# Patient Record
Sex: MALE | Race: WHITE | NOT HISPANIC OR LATINO | Employment: UNEMPLOYED | URBAN - METROPOLITAN AREA
[De-identification: names, ages, dates, MRNs, and addresses within clinical notes are randomized per-mention and may not be internally consistent; named-entity substitution may affect disease eponyms.]

---

## 2018-04-20 ENCOUNTER — THERAPY VISIT (OUTPATIENT)
Dept: PHYSICAL THERAPY | Facility: CLINIC | Age: 38
End: 2018-04-20
Payer: COMMERCIAL

## 2018-04-20 DIAGNOSIS — M54.50 LUMBAGO: Primary | ICD-10-CM

## 2018-04-20 PROCEDURE — 97161 PT EVAL LOW COMPLEX 20 MIN: CPT | Mod: GP | Performed by: PHYSICAL THERAPIST

## 2018-04-20 PROCEDURE — 97110 THERAPEUTIC EXERCISES: CPT | Mod: GP | Performed by: PHYSICAL THERAPIST

## 2018-04-20 NOTE — PROGRESS NOTES
[facility] Initial Evaluation  Subjective:  Patient is a 37 year old male presenting with rehab back hpi.   Uriah Tamez is a 37 year old male with a lumbar condition.  Condition occurred with:  Insidious onset.  Condition occurred: for unknown reasons.  This is a chronic condition  Patient is referred with a 10 year hx of B LBP and radiation to B posterior thighs. 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 LBP is constant and worse with standing and walking. He is better when sitting or lying down. X-rays seemed to indicate disc space narrowing per patient..    Patient reports pain:  Lumbar spine right, lumbar spine left and central lumbar spine.  Radiates to:  Gluteals right, gluteals left, thigh right and thigh left.  Pain is described as aching and is constant and reported as 3/10.  Associated symptoms:  Loss of strength. Pain is worse during the day.  Symptoms are exacerbated by walking, standing and twisting and relieved by rest.  Since onset symptoms are unchanged.  Special tests:  X-ray.  Previous treatment includes chiropractic.  There was moderate improvement following previous treatment.  General health as reported by patient is fair.        Current medications:  Sleep medication and high blood pressure medication.  Current occupation is On disability.  Patient is currently not working due to present treatment problem.      Barriers include:  None as reported by the patient.    Red flags:  None as reported by the patient.                        Objective:  System    Physical Exam      [employer] Lumbar Evaluation    Posture:  Sitting: poor  Standing: fair  Lordosis: WNL  Lateral Shift: no  Correction of Posture: better    Movement Loss:  Flexion (Flex): nil  Extension (EXT): nil and pain  Side [place] R (SG R): nil  Side Glide L (SG L): nil and pain  Test Movements:  FIS: During: no effect  After: no effect   Mechanical Response: no effectPretest Movements: LBP  Repeat FIS: During: no effect  After: no effect  Mechanical Response: no effect  EIS: During: increases  After: no worse  Mechanical Response: no effect  Repeat EIS: During: increases  After: no effect  Mechanical Response: no effect  JAIME: During: no effect  After: no effect  Mechanical Response: no effect  Repeat JAIME: During: no effect  After: no effect  Mechanical Response: no effect  EIL: During: no effect  After: no effect  Mechanical Response: no effect  Repeat EIL: During: decreases  After: better  Mechanical Response: IncROM        Conclusion: derangement and other (probable DDD)  Principle of Treatment:  Posture Correction: Discussed proper posture and use of lumbar roll    Extension: Trial of EIL based on exam findings x 10/ 2 hours                                           ROS    Assessment/Plan:    Patient is a 37 year old male with lumbar complaints.    Patient has the following significant findings with corresponding treatment plan.                Diagnosis 1:  LBP  Pain -  manual therapy, self management, education, directional preference exercise and home program  Decreased strength - therapeutic exercise, therapeutic activities and home program  Impaired muscle performance - neuro re-education and home program  Decreased function - therapeutic activities and home program  Impaired posture - neuro re-education and home program    Therapy Evaluation Codes:   1) History comprised of:   Personal factors that impact the plan of care:      Past/current experiences, Time since onset of symptoms and Work status.    Comorbidity factors that impact the plan of care are:      None.     Medications impacting care: Anti-depressant, High blood pressure and Sleep.  2) Examination of Body Systems comprised of:   Body structures and functions that impact the plan of care:      Lumbar spine.   Activity limitations that impact the plan of care are:      Standing,  Walking and Working.  3) Clinical presentation characteristics are:   Stable/Uncomplicated.  4) Decision-Making    Low complexity using standardized patient assessment instrument and/or measureable assessment of functional outcome.  Cumulative Therapy Evaluation is: Low complexity.    Previous and current functional limitations:  (See Goal Flow Sheet for this information)    Short term and Long term goals: (See Goal Flow Sheet for this information)     Communication ability:  Patient appears to be able to clearly communicate and understand verbal and written communication and follow directions correctly.  Treatment Explanation - The following has been discussed with the patient:   RX ordered/plan of care  Anticipated outcomes  Possible risks and side effects  This patient would benefit from PT intervention to resume normal activities.   Rehab potential is good.    Frequency:  1 X week, once daily  Duration:  for 4 weeks  Discharge Plan:  Achieve all LTG.  Independent in home treatment program.  Reach maximal therapeutic benefit.    Please refer to the daily flowsheet for treatment today, total treatment time and time spent performing 1:1 timed codes.

## 2018-04-20 NOTE — PROGRESS NOTES
[facility] Initial Evaluation  Subjective:  Patient is a 37 year old male presenting with rehab left ankle/foot hpi.                                      Pertinent medical history includes:  Overweight, high blood pressure, depression and other.  Medical allergies: no.  Other surgeries include:  Orthopedic surgery.        Primary job tasks include:  Prolonged sitting.              Oswestry Score: 37.5 %                 Objective:  System    Physical Exam    General     ROS    Assessment/Plan:

## 2018-04-20 NOTE — MR AVS SNAPSHOT
"              After Visit Summary   4/20/2018    Uriah Tamez    MRN: [id number]           Patient Information     Date Of Birth          1980        Visit Information        Provider Department      4/20/2018 1:10 PM Brandon You, PT [facility]        Today's Diagnoses     Lumbago    -  1       Follow-ups after your visit        Your next 10 appointments already scheduled     Apr 26, 2018  1:00 PM CDT   [facility] with Jamar Garcia PT   [facility] ([facility] [place])    [address]  [place] [address]   [phone]            May 03, 2018  1:00 PM CDT   [facility] Spine with Jamar Garcia PT   [facility] ([facility] [place])    [address]  [place] [address] [phone]              Who to contact     If you have questions or need follow up information about today's clinic visit or your schedule please contact [facility] directly at [phone].  Normal or non-critical lab and imaging results will be communicated to you by Pulmonxhart, letter or phone within 4 business days after the clinic has received the results. If you do not hear from us within 7 days, please contact the clinic through Acisiont or phone. If you have a critical or abnormal lab result, we will notify you by phone as soon as possible.  Submit refill requests through [employer] or call your pharmacy and they will forward the refill request to us. Please allow 3 business days for your refill to be completed.          Additional Information About Your Visit        PulmonxharKonaWare Information     [employer] lets you send messages to your doctor, view your test results, renew your prescriptions, schedule appointments and more. To sign up, go to [email or web address] . Click on \"Log in\" on the left side of the screen, which will take you to the Welcome page. Then click on \"Sign up Now\" on the right side of the page. "     You will be asked to enter the access code listed below, as well as some personal information. Please follow the directions to create your username and password.     Your access code is: [email or web address]  Expires: 2018  2:36 PM     Your access code will  in 90 days. If you need help or a new code, please call your [place] clinic or [phone].        Care EveryWhere ID     This is your Care EveryWhere ID. This could be used by other organizations to access your [place] medical records  [id number]         Blood Pressure from Last 3 Encounters:   No data found for BP    Weight from Last 3 Encounters:   No data found for Wt              We Performed the Following     HC PT EVAL, LOW COMPLEXITY     [facility] INITIAL EVAL REPORT     THERAPEUTIC EXERCISES        Primary Care Provider Fax #    [facility] [phone]       [facility] [address]        Equal Access to Services     SHEFALI [facility] : Hadii jolene yu Soluis alberto, waaxda lora, qaybta kaalmada cali, erin cagle . So [facility] [phone].    ATENCIÓN: Si habla español, tiene a arce disposición servicios gratuitos de asistencia lingüística. Llame al [phone].    We comply with applicable federal civil rights laws and [place] laws. We do not discriminate on the basis of race, color, national origin, age, disability, sex, sexual orientation, or gender identity.            Thank you!     Thank you for choosing [facility]  for your care. Our goal is always to provide you with excellent care. Hearing back from our patients is one way we can continue to improve our services. Please take a few minutes to complete the written survey that you may receive in the mail after your visit with us. Thank you!             Your Updated Medication List - Protect others around you: Learn how to safely use, store and throw away your medicines at  www.disposemymeds.org.      Notice  As of 4/20/2018  2:36 PM    You have not been prescribed any medications.

## 2018-04-20 NOTE — LETTER
[facility]  [address]  [place] [address]  [phone]    2018    Re: Uriah Tamez   :   1980  MRN:  [id number]   REFERRING PHYSICIAN:   Tiffanie Maynard    [facility]    Date of Initial Evaluation:  2018  Visits:  Rxs Used: 1  Reason for Referral:  Lumbago    EVALUATION SUMMARY    [facility] Initial Evaluation  Subjective:  Patient is a 37 year old male presenting with rehab back hpi.   Uriah Tamez is a 37 year old male with a lumbar condition.  Condition occurred with:  Insidious onset.  Condition occurred: for unknown reasons.  This is a chronic condition  Patient is referred with a 10 year hx of B LBP and radiation to B posterior thighs. 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 LBP is constant and worse with standing and walking. He is better when sitting or lying down. X-rays seemed to indicate disc space narrowing per patient..    Patient reports pain:  Lumbar spine right, lumbar spine left and central lumbar spine.  Radiates to:  Gluteals right, gluteals left, thigh right and thigh left.  Pain is described as aching and is constant and reported as 3/10.  Associated symptoms:  Loss of strength. Pain is worse during the day.  Symptoms are exacerbated by walking, standing and twisting and relieved by rest.  Since onset symptoms are unchanged.  Special tests:  X-ray.  Previous treatment includes chiropractic.  There was moderate improvement following previous treatment.  General health as reported by patient is fair.        Current medications:  Sleep medication and high blood pressure medication.  Current occupation is On disability.  Patient is currently not working due to present treatment problem. Pertinent medical history includes:  Overweight, high blood pressure, depression and other.  Medical allergies:  no.  Other surgeries include:  Orthopedic surgery.        Primary job tasks include:  Prolonged sitting.  Barriers include:  None as reported by the patient.  Red flags:  None as reported by the patient. Oswestry Score: 37.5 %         Objective:  Physical Exam    [facility] Lumbar Evaluation  Posture:  Sitting: poor  Standing: fair  Lordosis: WNL  Lateral Shift: no  Correction of Posture: better    Re: Uriah Tamez   :   1980    Movement Loss:  Flexion (Flex): nil  Extension (EXT): nil and pain  Side [place] R (SG R): nil  Side Glide L (SG L): nil and pain  Test Movements:  FIS: During: no effect  After: no effect  Mechanical Response: no effectPretest Movements: LBP  Repeat FIS: During: no effect  After: no effect  Mechanical Response: no effect  EIS: During: increases  After: no worse  Mechanical Response: no effect  Repeat EIS: During: increases  After: no effect  Mechanical Response: no effect  JAIME: During: no effect  After: no effect  Mechanical Response: no effect  Repeat JAIME: During: no effect  After: no effect  Mechanical Response: no effect  EIL: During: no effect  After: no effect  Mechanical Response: no effect  Repeat EIL: During: decreases  After: better  Mechanical Response: IncROM  Conclusion: derangement and other (probable DDD)  Principle of Treatment:  Posture Correction: Discussed proper posture and use of lumbar roll  Extension: Trial of EIL based on exam findings x 10/ 2 hours    Assessment/Plan:    Patient is a 37 year old male with lumbar complaints.    Patient has the following significant findings with corresponding treatment plan.                Diagnosis 1:  LBP  Pain -  manual therapy, self management, education, directional preference exercise and home program  Decreased strength - therapeutic exercise, therapeutic activities and home program  Impaired muscle performance - neuro re-education and home program  Decreased function - therapeutic activities and home program  Impaired  posture - neuro re-education and home program  Therapy Evaluation Codes:   1) History comprised of:   Personal factors that impact the plan of care:      Past/current experiences, Time since onset of symptoms and Work status.    Comorbidity factors that impact the plan of care are:      None.     Medications impacting care: Anti-depressant, High blood pressure and Sleep.  2) Examination of Body Systems comprised of:   Body structures and functions that impact the plan of care:      Lumbar spine.   Activity limitations that impact the plan of care are:      Standing, Walking and Working.  3) Clinical presentation characteristics are:   Stable/Uncomplicated.  4) Decision-Making    Low complexity using standardized patient assessment instrument and/or measureable assessment of functional outcome.  Cumulative Therapy Evaluation is: Low complexity.  Previous and current functional limitations:  (See Goal Flow Sheet for this information)    Short term and Long term goals: (See Goal Flow Sheet for this information)   Communication ability:  Patient appears to be able to clearly communicate and  Re: Uriah T Dashawn   :   1980       understand verbal and written communication and follow directions correctly.  Treatment Explanation - The following has been discussed with the patient:   RX ordered/plan of care  Anticipated outcomes  Possible risks and side effects  This patient would benefit from PT intervention to resume normal activities.   Rehab potential is good.  Frequency:  1 X week, once daily  Duration:  for 4 weeks  Discharge Plan:  Achieve all LTG.  Independent in home treatment program.  Reach maximal therapeutic benefit.        Thank you for your referral.    INQUIRIES  Therapist: Brandon You, PT   [facility]  [address] [address]  Phone: [phone]  Fax: [phone]

## 2024-11-05 ENCOUNTER — TRANSFERRED RECORDS (OUTPATIENT)
Dept: HEALTH INFORMATION MANAGEMENT | Facility: CLINIC | Age: 44
End: 2024-11-05

## 2024-11-06 ENCOUNTER — TRANSCRIBE ORDERS (OUTPATIENT)
Dept: OTHER | Age: 44
End: 2024-11-06

## 2024-11-06 DIAGNOSIS — Z31.84 ENCOUNTER FOR FERTILITY PRESERVATION PROCEDURE: Primary | ICD-10-CM

## 2024-11-07 ENCOUNTER — TELEPHONE (OUTPATIENT)
Dept: UROLOGY | Facility: CLINIC | Age: 44
End: 2024-11-07

## 2024-11-07 NOTE — TELEPHONE ENCOUNTER
[facility] Call Center    Phone Message    May a detailed message be left on voicemail: no     Reason for Call: Other: Patient called to make an appointment for a referral that's in system for Encounter for fertility preservation procedure [Z31.84]. Writer is not sure who to schedule with for this procedure. Please call patient to schedule.     Action Taken: Message routed to:  [facility] Urology    Travel Screening: Not Applicable

## 2024-11-14 ENCOUNTER — LAB (OUTPATIENT)
Dept: LAB | Facility: CLINIC | Age: 44
End: 2024-11-14
Payer: COMMERCIAL

## 2024-11-14 DIAGNOSIS — Z31.84 ENCOUNTER FOR FERTILITY PRESERVATION PROCEDURE: Primary | ICD-10-CM

## 2024-11-14 LAB
ABNORMAL SPERM MORPHOLOGY: 95
ABSTINENCE DAYS: 2 DAYS (ref 2–7)
AGGLUTINATION: NO
ANALYSIS TEMP - CENTIGRADE: 20 CENTIGRADE
COLLECTION METHOD: ABNORMAL
COLLECTION SITE: ABNORMAL
CONSENT TO RELEASE TO PARTNER: YES
DAL- RECEIVED TIME: ABNORMAL
HEAD DEFECT: 95 %
IMMOTILE: 50 %
LIQUEFIED: YES
MIDPIECE DEFECT: 24 %
NON-PROGRESSIVE MOTILITY: 3 %
NORMAL SPERM MORPHOLOGY: 5 % NORMAL FORMS
PROGRESSIVE MOTILITY: 47 %
ROUND CELLS: 0.3 MILLION/ML
SPECIMEN PH: 7.2 PH
SPECIMEN VOLUME: 1 ML
SPERM CONCENTRATION: 51 MILLION/ML
TAIL DEFECT: 1 %
TIME OF ANALYSIS: ABNORMAL
TOTAL PROGRESSIVE MOTILE NUMBER: 24 MILLION
TOTAL SPERM NUMBER: 51 MILLION
VISCOUS: YES
VITALITY: ABNORMAL

## 2024-11-14 PROCEDURE — 89322 SEMEN ANAL STRICT CRITERIA: CPT

## 2024-11-18 ENCOUNTER — MYC MEDICAL ADVICE (OUTPATIENT)
Dept: UROLOGY | Facility: CLINIC | Age: 44
End: 2024-11-18

## 2024-11-18 NOTE — TELEPHONE ENCOUNTER
Sent mychart for the patient to call back and schedule the following:    Appointment type: new infertility   Provider: Jose Duffy MD  Return date: 1/21 @ 8:40am  Specialty phone number: n/a  Additional appointment(s) needed: pt completed labs with the [facility])  Additonal Notes: sent Good [place] with time and day

## 2024-12-22 ENCOUNTER — HEALTH MAINTENANCE LETTER (OUTPATIENT)
Age: 44
End: 2024-12-22